# Patient Record
Sex: FEMALE
[De-identification: names, ages, dates, MRNs, and addresses within clinical notes are randomized per-mention and may not be internally consistent; named-entity substitution may affect disease eponyms.]

---

## 2023-07-12 PROBLEM — Z00.00 ENCOUNTER FOR PREVENTIVE HEALTH EXAMINATION: Status: ACTIVE | Noted: 2023-07-12

## 2023-07-19 ENCOUNTER — APPOINTMENT (OUTPATIENT)
Dept: HEMATOLOGY ONCOLOGY | Facility: CLINIC | Age: 28
End: 2023-07-19

## 2023-07-19 NOTE — DISCUSSION/SUMMARY
[FreeTextEntry1] : REASON FOR CONSULT\par Harriett Baez is a 28-year-old female who was self-referred for cancer genetic counseling and risk assessment due to her family history of cancer. \par \par RELEVANT MEDICAL HISTORY\par Ms. Baez is a healthy individual who has never had cancer. She has a family history of cancer, see below. Her father’s tumor genetic testing revealed a BRCA2 mutation. Report was available for review at today’s appointment.\par \par OTHER MEDICAL AND SURGICAL HISTORY:\par •	Medical History: No major medical history reported\par •	Surgical History: appendectomy (11 years old)\par \par PAST OB/GYN HISTORY:\par Obstetrical History: \par Age at Menarche: 11\par Premenopausal \par Age at First Live Birth: N/A\par Oral Contraceptive Use: Yes, [id number]\par Hormone Replacement Therapy: No\par \par CANCER SCREENING HISTORY:  \par Breast: \par •	Mammography: No\par •	Sonography: Patient reports undergoing a breast ultrasound at 23 years old due to breast pain. She reports that results were normal.\par •	MRI: No\par •	Biopsies: No\par GYN:\par •	Pelvic Examination: last 10/2022, reportedly normal\par •	Sonography: last 10/2022 due to cysts, reportedly normal \par •	CA-125: No\par Colon:\par •	Colonoscopy: No\par •	Upper Endoscopy: last 10/2021 due to reflux, reportedly normal \par Skin:  \par •	FBSE: No\par •	Lesions biopsied/removed: No\par \par SOCIAL HISTORY:\par •	Tobacco-product use: No\par \par FAMILY HISTORY:\par Maternal ancestry was reported as [place]ic and Ashkenazi Anabaptism and paternal ancestry was reported as [place]. Consanguinity was denied. A detailed family history of cancer was ascertained. Relevant diagnoses are detailed below and in the scanned pedigree. \par \par Of note, Ms. Baez’s father was diagnosed with cancer of unknown origin. She reports that his cancer diagnosis is possibly of bladder origin and spread through the urinary tract. He underwent tumor genetic testing which revealed a BRCA2 mutation (p.*). Her father’s tumor genetic testing also revealed alterations in CCNE1, CDKN1A, KMT2D, MAPK1, RB1, SMARCA4, TERT, and TP53. Report was available for review at today’s appointment.\par 	\par 	RISK ASSESSMENT:\par Ms. 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 We recommended genetic testing for the BRCA1 and BRCA2 genes. This test analyzes [2] genes: BRCA1, BRCA2\par \par We discussed the risks, benefits and limitations, and implications of genetic testing. We also discussed the psychosocial implications of genetic testing. Possible test results were reviewed with Ms. Baez, along with associated medical management options. The Genetic Information Non-discrimination Act (BRYCE) was also reviewed. \par \par Ms. Baez consented to the above-mentioned genetic testing panel. Blood was drawn in our laboratory and sent to [facility] today.\par \par PLAN:\par \par 1.	Blood drawn today will be sent to [facility] for analysis. \par 2.	We will contact Ms. Baez once the results are available and will schedule a follow-up appointment, as needed. Results generally return in 2-3 weeks from the day the sample is received in the lab.\par 3.	Family member’s report will be scanned to [facility] secure file cabinet.\par \par For any additional questions please call [facility] at [phone]. \par \par \par Monet Ortega MS, [facility] [place] [facility]\par Genetic Counselor, [facility]\par \par \par CC: \par Patient\par

## 2023-08-09 ENCOUNTER — NON-APPOINTMENT (OUTPATIENT)
Age: 28
End: 2023-08-09

## 2023-08-21 ENCOUNTER — NON-APPOINTMENT (OUTPATIENT)
Age: 28
End: 2023-08-21

## 2023-08-21 NOTE — DISCUSSION/SUMMARY
[FreeTextEntry1] : RESULTS TRANSMISSION Harriett Baez is a 28-year-old female who was called on 08/21/2023 for a discussion regarding their genetic testing results related to hereditary cancer predisposition.   Ms. Baez was originally seen at [facility] on 07/19/2023 for hereditary cancer predisposition risk assessment due to a family history of cancer. Ms. Baez decided to pursue genetic testing for the BRCA1 and BRCA2 genes offered by [facility].  INTERVAL HISTORY Upon completion of the initial panel, Ms. Baez was re-contacted on 08/09/2023 regarding her results and consented to pursue additional genetic testing for the SMARCA4 and TP53 genes.  TEST RESULTS: NEGATIVE  No pathogenic (disease-causing) variants or VUSs were detected in the following genes:  BRCA1, BRCA2, SMARCA4, TP53  Of note, Ms. Baez tested negative for the BRCA2 mutation identified on her father's tumor genetic testing results.   RESULTS INTERPRETATION AND ASSESSMENT: Given Ms. Baez's personal medical history and current reported family history of cancer, and her negative genetic test results, the following screening guidelines and risk-reducing recommendations were discussed:  OTHER:  - In the absence of other indications, Ms. Baez should practice age-appropriate cancer screening of other organ systems as recommended for the general population.  We also discussed that, while no cause of the patient's personal and family history of cancer was identified, this result, while reassuring, does entirely not rule out a hereditary cancer risk in the patient. It is possible, although unlikely, the patient has a mutation in one of the genes tested that is not detectable by this analysis, or has a mutation in a different gene, either known or unknown. It is also possible there is a hereditary cancer predisposition in the family, but the patient did not inherit it.  We informed Ms. Baez that our knowledge of genetics and inherited cancer conditions is changing rapidly. Therefore, we recommended that Ms. Baez contact our office, every 2 to 3 years, to discuss relevant advances in cancer genetics.  We emphasized the importance of re-contacting us with updates regarding her personal and family history of cancer as well as any updates regarding additional cancer genetic test results performed for the patient and/or family members.  Such updates could possibly change our risk assessment and recommendations.   In addition, we discussed Ms. Baez's brother could consider pursuing cancer risk assessment genetic counseling with the option of genetic testing.   PLAN: 1.See above for recommended screening and risk-reduction strategies. 2. Patient informed consult note(s) will be available through their [facility] patient portal and genetic test results will be released via [facility] laboratory portal.  3. Ms. Baez was encouraged to contact us every 2-3 years to discuss relevant advances in cancer genetics, or sooner if there are any changes in her personal or family history of cancer.   For any additional questions please call [facility] at [phone].    Monet Ortega MS, [facility] Counselor, [facility]   CC:  Patient